# Patient Record
(demographics unavailable — no encounter records)

---

## 2019-07-16 NOTE — NUR
ASSUMED CARE OF PATIENT. ALERT AND ORIENTED. NO SIGNS OF SOB/DISTRESS NOTED. BED SET TO 
LOWEST POSITION/LOCKED. BEDSIDE RAILS UP X2. CALL LIGHT WITHIN REACH. INSTRUCTED PATIENT TO 
CALL FOR ASSISTANCE. WILL CONTINUE TO MONITOR Q1HR AND PRN.

## 2019-07-16 NOTE — NUR
Hospitalist paged:

Patient is having complaints of pain that is a 7 out of 10 on the pain scale for abdominal 
pain and is requesting morphine. No morphine scheduled prn.   Paged the hospitalist.

## 2019-07-16 NOTE — NUR
NOTIFIED HOSPITALIST DR. ENG OF PT'S K LEVEL 2.6, NEW ORDER OBTAINED 40MEQ PO ONCE. ORDER 
READ BACK AND VERIFIED WILL CARRY OUT ORDER.

## 2019-07-16 NOTE — NUR
Unable to reconcile medications:

Patient does not recall list of medications he takes at home.  Girl friend is to bring list 
of medications in the AM.  Patient verbalized understanding and is to bring the medication 
list in the AM.

## 2019-07-16 NOTE — NUR
Hospitalist called back:

New orders for morphine 2mg IV [id number] for severe pain received. To place and carry out 
orders.

## 2019-07-16 NOTE — NUR
PT WANTS TO DISCONNECT FROM IV, EDUCATED PT THE NEED TO CONNECT TO IV FOR NS [id number] DUE TO 
K LEVEL TRENDING DOWN. PT INSISTS WANT TO DISCONNECT FROM IV AND ASKING TO TALK TO CHARGE 
RN. WILL NOTIFY MRS MEADE CHARGE RN.

## 2019-07-17 NOTE — NUR
Opening Shift Note

RECEIVED REPORT FROM NOC RN. Assumed care of patient, CURRENTLY SLEEPING. No S/S of 
distress/SOB or pain. BED IN LOWEST, LOCKED POSITION WITH SIDERAILS UP x2. WILL instruct on 
POC and to call for assist PRN, will continue to monitor for changes Q1hr and PRN.

## 2019-07-17 NOTE — NUR
Discharge instructions given as ordered. Encourage to follow up with PMD as instructed. All 
questions and concerns addressed. Patient verbalized understanding. Medication 
reconciliation form completed and copy given to patient. IV removed with catheter intact, 
pressure dressing applied. Telemetry unit returned to [facility]. Patient taken to vehicle via 
wheelchair with all personal belongings, accompanied by staff and family member. No distress 
noted at time of departure.